# Patient Record
Sex: MALE | Race: WHITE | NOT HISPANIC OR LATINO | Employment: UNEMPLOYED
[De-identification: names, ages, dates, MRNs, and addresses within clinical notes are randomized per-mention and may not be internally consistent; named-entity substitution may affect disease eponyms.]

---
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
## 2022-09-04 ENCOUNTER — OFFICE VISIT (OUTPATIENT)
Dept: FAMILY MEDICINE | Facility: OTHER | Age: 22
End: 2022-09-04
Attending: FAMILY MEDICINE
Payer: COMMERCIAL

## 2022-09-04 VITALS
WEIGHT: 200.8 LBS | HEIGHT: 70 IN | SYSTOLIC BLOOD PRESSURE: 140 MMHG | OXYGEN SATURATION: 97 % | DIASTOLIC BLOOD PRESSURE: 80 MMHG | TEMPERATURE: 99.4 F | RESPIRATION RATE: 16 BRPM | HEART RATE: 124 BPM | BODY MASS INDEX: 28.75 KG/M2

## 2022-09-04 DIAGNOSIS — R35.0 URINARY FREQUENCY: Primary | ICD-10-CM

## 2022-09-04 LAB
ALBUMIN UR-MCNC: NEGATIVE MG/DL
APPEARANCE UR: CLEAR
BILIRUB UR QL STRIP: NEGATIVE
COLOR UR AUTO: ABNORMAL
GLUCOSE UR STRIP-MCNC: 150 MG/DL
HGB UR QL STRIP: NEGATIVE
KETONES UR STRIP-MCNC: NEGATIVE MG/DL
LEUKOCYTE ESTERASE UR QL STRIP: NEGATIVE
NITRATE UR QL: NEGATIVE
PH UR STRIP: 6 [PH] (ref 5–9)
SP GR UR STRIP: 1.01 (ref 1–1.03)
UROBILINOGEN UR STRIP-MCNC: NORMAL MG/DL

## 2022-09-04 PROCEDURE — G0463 HOSPITAL OUTPT CLINIC VISIT: HCPCS

## 2022-09-04 PROCEDURE — 87086 URINE CULTURE/COLONY COUNT: CPT | Mod: ZL | Performed by: FAMILY MEDICINE

## 2022-09-04 PROCEDURE — 81003 URINALYSIS AUTO W/O SCOPE: CPT | Mod: ZL | Performed by: FAMILY MEDICINE

## 2022-09-04 PROCEDURE — 99213 OFFICE O/P EST LOW 20 MIN: CPT | Performed by: FAMILY MEDICINE

## 2022-09-04 ASSESSMENT — PAIN SCALES - GENERAL: PAINLEVEL: NO PAIN (0)

## 2022-09-04 NOTE — PROGRESS NOTES
"      (R35.0) Urinary frequency  (primary encounter diagnosis)  Comment:     More of a complaint of obstructed voiding which has now resolved.  Request was to rule out a UTI.  Urinalysis did not show evidence of infection.    Plan: UA reflex to Microscopic, Urine Culture Aerobic        Bacterial          Basically reassurance.  He has some upcoming surgery and will have a preop.      CHIEF COMPLAINT    Difficulty passing urine previous day.      HISTORY    This young man seems to be describing some obstructed urination which occurred a day ago.  He was not having dysuria itself.  He evidently had this happen once before.    Not having fevers or chills.  No abdominal or back pain.    He does have some upcoming ankle surgery.      REVIEW OF SYSTEMS    No fever or chills.  No congestion or cough.  No abdominal pain or nausea.  No skin rashes.      EXAM  BP (!) 150/80 (BP Location: Right arm, Patient Position: Sitting, Cuff Size: Adult Regular)   Pulse (!) 124   Temp 99.4  F (37.4  C) (Tympanic)   Resp 16   Ht 1.765 m (5' 9.5\")   Wt 91.1 kg (200 lb 12.8 oz)   SpO2 97%   BMI 29.23 kg/m        Results for orders placed or performed in visit on 09/04/22   UA reflex to Microscopic     Status: Abnormal   Result Value Ref Range    Color Urine Light Yellow Colorless, Straw, Light Yellow, Yellow    Appearance Urine Clear Clear    Glucose Urine 150  (A) Negative mg/dL    Bilirubin Urine Negative Negative    Ketones Urine Negative Negative mg/dL    Specific Gravity Urine 1.012 1.000 - 1.030    Blood Urine Negative Negative    pH Urine 6.0 5.0 - 9.0    Protein Albumin Urine Negative Negative mg/dL    Urobilinogen Urine Normal Normal, 2.0 mg/dL    Nitrite Urine Negative Negative    Leukocyte Esterase Urine Negative Negative    Narrative    Microscopic not indicated       "

## 2022-09-04 NOTE — NURSING NOTE
Pt here with mom for possible UTI.  He states he feels he is not emptying his bladder, dribbling and urine looked cloudy. He states it started about 1:00 this morning.  Pt has surgery on 9/8/22.  Kayla Murrell CMA ([facility])......................9/4/2022  2:01 PM       Medication Reconciliation: complete    Kayla Murrell CMA  9/4/2022 2:01 PM

## 2022-09-06 ENCOUNTER — TELEPHONE (OUTPATIENT)
Dept: FAMILY MEDICINE | Facility: OTHER | Age: 22
End: 2022-09-06

## 2022-09-06 LAB — BACTERIA UR CULT: NO GROWTH

## 2022-09-06 NOTE — TELEPHONE ENCOUNTER
Pt is looking for results from test done at  this weekend.  Please call.      Jonh Sanchez on 9/6/2022 at 10:21 AM

## 2022-09-06 NOTE — TELEPHONE ENCOUNTER
See result note. The patient was given that information.  Amairani Akers LPN..................9/6/2022   10:53 AM

## 2023-09-26 ENCOUNTER — HOSPITAL ENCOUNTER (OUTPATIENT)
Dept: GENERAL RADIOLOGY | Facility: OTHER | Age: 23
Discharge: HOME | End: 2023-09-26
Attending: FAMILY MEDICINE | Admitting: FAMILY MEDICINE
Payer: COMMERCIAL

## 2023-09-26 DIAGNOSIS — R13.12 OROPHARYNGEAL DYSPHAGIA: ICD-10-CM

## 2023-09-26 PROCEDURE — 250N000013 HC RX MED GY IP 250 OP 250 PS 637: Performed by: RADIOLOGY

## 2023-09-26 PROCEDURE — 74221 X-RAY XM ESOPHAGUS 2CNTRST: CPT

## 2023-09-26 RX ADMIN — ANTACID/ANTIFLATULENT 4 G: 380; 550; 10; 10 GRANULE, EFFERVESCENT ORAL at 15:30

## 2023-11-27 ENCOUNTER — HOSPITAL ENCOUNTER (OUTPATIENT)
Dept: GENERAL RADIOLOGY | Facility: OTHER | Age: 23
Discharge: HOME | End: 2023-11-27
Attending: FAMILY MEDICINE | Admitting: FAMILY MEDICINE
Payer: COMMERCIAL

## 2023-11-27 ENCOUNTER — THERAPY VISIT (OUTPATIENT)
Dept: SPEECH THERAPY | Facility: OTHER | Age: 23
End: 2023-11-27
Attending: FAMILY MEDICINE
Payer: COMMERCIAL

## 2023-11-27 DIAGNOSIS — R13.12 DYSPHAGIA, OROPHARYNGEAL: Primary | ICD-10-CM

## 2023-11-27 DIAGNOSIS — R13.12 OROPHARYNGEAL DYSPHAGIA: ICD-10-CM

## 2023-11-27 PROCEDURE — 74230 X-RAY XM SWLNG FUNCJ C+: CPT

## 2023-11-27 PROCEDURE — 92611 MOTION FLUOROSCOPY/SWALLOW: CPT | Mod: GN

## 2023-11-27 NOTE — PROGRESS NOTES
SPEECH LANGUAGE PATHOLOGY EVALUATION    See electronic medical record for Abuse and Falls Screening details.    Subjective      Presenting condition or subjective complaint:  Pt arriving to evaluation with mother for complaints of choking on spit, food, and liquids. Information gathered from pt and his mother. Pt reports he has had swallowing difficulties since he was a child and that the choking happens sporadically. He occasionally reports difficulty breathing and does have upcoming testing for sleep apnea. He is unable to provide information on which textures appear more difficult for him. Pt's mom reports that pt does take very large bites of food at times and this could be part of his swallowing difficulties.  Relevant medical history:   Potential JOSE; will have testing in future to determine    Prior diagnostic imaging/testing results:     Pt had EGD on 9/26/23 and findings were unremarkable  Prior therapy history for the same diagnosis, illness or injury:    None    Living Environment  Social support:   Lives at home with his mother  Help at home:  Mother     Objective     SWALLOW EVALUTION  Dysphagia history: Pt self-reports swallowing difficulties since he was a child  Current Diet/Method of Nutritional Intake: thin liquids (level 0), regular diet        VIDEOFLUOROSCOPIC SWALLOW STUDY  Radiologist: Dr. Tuttle  Views Taken: left lateral   Physical location of procedure: GICH  Patient sitting upright on chair/stool     VFSS textures trialed:   VFSS Eval: Thin Liquids  Mode of Presentation: cup, straw, self-fed   Order of Presentation: 1, 2, 3  Preparatory Phase: WFL  Oral Phase: residue in oral cavity, premature pharyngeal entry  Bolus Location When Swallow Initiated: posterior angle of ramus, valleculae, pyriforms  Pharyngeal Phase: impaired hyolaryngel excursion, impaired epiglottic movement, residue in valleculae  Rosenbeck's Penetration Aspiration Scale: 1 - no aspiration, contrast does not enter  airway  Diagnostic Statement: Pt taking single and consecutive sips of liquid via cup and straw. Demonstrating occasional impaired hyolaryngeal excursion with delayed but complete epiglottic inversion; remainder of trials had timely epiglottic and complete inversion. Demonstrating with mild-trace residue in valleculae and able to clear majority with independent subsequent swallows. No penetration/aspiration observed. Pt WFL/WNL for thin liquids.    VFSS Eval: Purees  Mode of Presentation: spoon, fed by clinician   Order of Presentation: 4, 5  Preparatory Phase: WFL  Oral Phase: residue in oral cavity  Bolus Location When Swallow Initiated: posterior angle of ramus  Pharyngeal Phase: residue in valleculae  Rosenbeck s Penetration Aspiration Scale: 1 - no aspiration, contrast does not enter airway  Diagnostic Statement: Pt stripping bolus from spoon and demonstrating with trace residue in valleculae. No penetration/aspiration observed. Pt WFL/WNL for purees.    VFSS Eval: Solids  Mode of Presentation: spoon, fed by clinician   Order of Presentation: 6, 7  Preparatory Phase: WFL  Oral Phase: residue in oral cavity  Bolus Location When Swallow Initiated: valleculae  Pharyngeal Phase: residue in valleculae   Rosenbeck s Penetration Aspiration Scale: 1 - no aspiration, contrast does not enter airway  Diagnostic Statement: Pt stripping bolus from spoon and demonstrating with trace residue in valleculae. No penetration/aspiration observed. Pt WFL/WNL for regular texture.     ESOPHAGEAL PHASE OF SWALLOW  no observed or reported concerns related to esophageal function  please refer to radiologist's report for details  Pt had EGD on 9/26/23; findings were unremarkable per chart review      SWALLOW ASSESSMENT CLINICAL IMPRESSIONS AND RATIONALE  Diet Consistency Recommendations: thin liquids (level 0), regular diet    Recommended Feeding/Eating Techniques: small bolus size, double swallow, monitor for cough or change in vocal  quality with intake, maintain upright sitting position for eating     Assessment & Plan   CLINICAL IMPRESSIONS   Impression/Assessment: Pt is a 23 year old male with complaints of choking on spit, food, and liquids. This behavior was not observed during the swallow study. Pt demonstrating to be WNL/WFL for swallowing safety and function. Pt's mom reports that pt does take large bites of food at times; pt encouraged to take small bites and sips to decrease risk of choking. Pt's mom reports that he could potentially have obstructive sleep apnea and pt will be doing testing in the future. Discussed that JOSE and breathing can have an affect on swallowing and could be the potential cause of sporadic choking. Pt encouraged to pursue evaluation/treatment for JOSE and encouraged to contact SLP/PCP if swallowing symptoms worsen or do not improve. Pt and pt's mom voicing agreement in plan.    PLAN OF CARE  Education Assessment:   Learner/Method: Patient;Family;Listening;Pictures/Video  Education Comments: Pt and pt's mom educated on A&P of swallow, assessment results, and recommendations.    Risks and benefits of evaluation/treatment have been explained.   Patient/Family/caregiver agrees with Plan of Care.     Evaluation Time:    SLP Eval: VideoFluoroscopic Swallow function Minutes ([id number]): 60    EVAL ONLY    Signing Clinician: THEE Umana